# Patient Record
Sex: FEMALE | Race: WHITE | NOT HISPANIC OR LATINO
[De-identification: names, ages, dates, MRNs, and addresses within clinical notes are randomized per-mention and may not be internally consistent; named-entity substitution may affect disease eponyms.]

---

## 2018-03-26 ENCOUNTER — APPOINTMENT (OUTPATIENT)
Dept: MAMMOGRAPHY | Facility: HOSPITAL | Age: 48
End: 2018-03-26

## 2018-03-26 ENCOUNTER — APPOINTMENT (OUTPATIENT)
Dept: ULTRASOUND IMAGING | Facility: HOSPITAL | Age: 48
End: 2018-03-26

## 2018-03-26 ENCOUNTER — OUTPATIENT (OUTPATIENT)
Dept: OUTPATIENT SERVICES | Facility: HOSPITAL | Age: 48
LOS: 1 days | End: 2018-03-26
Payer: COMMERCIAL

## 2018-03-26 DIAGNOSIS — Z00.8 ENCOUNTER FOR OTHER GENERAL EXAMINATION: ICD-10-CM

## 2018-03-26 PROCEDURE — 76641 ULTRASOUND BREAST COMPLETE: CPT | Mod: 26

## 2018-03-26 PROCEDURE — 76641 ULTRASOUND BREAST COMPLETE: CPT

## 2018-03-26 PROCEDURE — 77067 SCR MAMMO BI INCL CAD: CPT | Mod: 26

## 2018-03-26 PROCEDURE — 77063 BREAST TOMOSYNTHESIS BI: CPT

## 2018-03-26 PROCEDURE — 77067 SCR MAMMO BI INCL CAD: CPT

## 2018-03-26 PROCEDURE — 77063 BREAST TOMOSYNTHESIS BI: CPT | Mod: 26

## 2018-03-30 DIAGNOSIS — Z12.31 ENCOUNTER FOR SCREENING MAMMOGRAM FOR MALIGNANT NEOPLASM OF BREAST: ICD-10-CM

## 2018-03-30 DIAGNOSIS — R92.2 INCONCLUSIVE MAMMOGRAM: ICD-10-CM

## 2019-05-06 ENCOUNTER — APPOINTMENT (OUTPATIENT)
Dept: MAMMOGRAPHY | Facility: HOSPITAL | Age: 49
End: 2019-05-06
Payer: COMMERCIAL

## 2019-05-06 ENCOUNTER — APPOINTMENT (OUTPATIENT)
Dept: ULTRASOUND IMAGING | Facility: HOSPITAL | Age: 49
End: 2019-05-06
Payer: COMMERCIAL

## 2019-05-06 ENCOUNTER — OUTPATIENT (OUTPATIENT)
Dept: OUTPATIENT SERVICES | Facility: HOSPITAL | Age: 49
LOS: 1 days | End: 2019-05-06
Payer: COMMERCIAL

## 2019-05-06 DIAGNOSIS — Z12.31 ENCOUNTER FOR SCREENING MAMMOGRAM FOR MALIGNANT NEOPLASM OF BREAST: ICD-10-CM

## 2019-05-06 PROCEDURE — 77067 SCR MAMMO BI INCL CAD: CPT | Mod: 26

## 2019-05-06 PROCEDURE — 77063 BREAST TOMOSYNTHESIS BI: CPT | Mod: 26

## 2019-05-06 PROCEDURE — 76641 ULTRASOUND BREAST COMPLETE: CPT | Mod: 26,50

## 2019-05-06 PROCEDURE — 77067 SCR MAMMO BI INCL CAD: CPT

## 2019-05-06 PROCEDURE — 76641 ULTRASOUND BREAST COMPLETE: CPT

## 2019-05-06 PROCEDURE — 77063 BREAST TOMOSYNTHESIS BI: CPT

## 2020-07-29 ENCOUNTER — OUTPATIENT (OUTPATIENT)
Dept: OUTPATIENT SERVICES | Facility: HOSPITAL | Age: 50
LOS: 1 days | End: 2020-07-29
Payer: COMMERCIAL

## 2020-07-29 ENCOUNTER — APPOINTMENT (OUTPATIENT)
Dept: RADIOLOGY | Facility: HOSPITAL | Age: 50
End: 2020-07-29
Payer: COMMERCIAL

## 2020-07-29 ENCOUNTER — APPOINTMENT (OUTPATIENT)
Dept: MAMMOGRAPHY | Facility: HOSPITAL | Age: 50
End: 2020-07-29
Payer: COMMERCIAL

## 2020-07-29 ENCOUNTER — APPOINTMENT (OUTPATIENT)
Dept: ULTRASOUND IMAGING | Facility: HOSPITAL | Age: 50
End: 2020-07-29
Payer: COMMERCIAL

## 2020-07-29 DIAGNOSIS — Z12.73 ENCOUNTER FOR SCREENING FOR MALIGNANT NEOPLASM OF OVARY: ICD-10-CM

## 2020-07-29 PROCEDURE — 77080 DXA BONE DENSITY AXIAL: CPT | Mod: 26

## 2020-07-29 PROCEDURE — 76856 US EXAM PELVIC COMPLETE: CPT | Mod: 26

## 2020-07-29 PROCEDURE — 76830 TRANSVAGINAL US NON-OB: CPT

## 2020-07-29 PROCEDURE — 76830 TRANSVAGINAL US NON-OB: CPT | Mod: 26

## 2020-07-29 PROCEDURE — 77067 SCR MAMMO BI INCL CAD: CPT

## 2020-07-29 PROCEDURE — 77063 BREAST TOMOSYNTHESIS BI: CPT | Mod: 26

## 2020-07-29 PROCEDURE — 76856 US EXAM PELVIC COMPLETE: CPT

## 2020-07-29 PROCEDURE — 77063 BREAST TOMOSYNTHESIS BI: CPT

## 2020-07-29 PROCEDURE — 76641 ULTRASOUND BREAST COMPLETE: CPT | Mod: 26,50

## 2020-07-29 PROCEDURE — 77067 SCR MAMMO BI INCL CAD: CPT | Mod: 26

## 2020-07-29 PROCEDURE — 76641 ULTRASOUND BREAST COMPLETE: CPT

## 2020-07-29 PROCEDURE — 77080 DXA BONE DENSITY AXIAL: CPT

## 2020-07-30 ENCOUNTER — APPOINTMENT (OUTPATIENT)
Dept: DERMATOLOGY | Facility: CLINIC | Age: 50
End: 2020-07-30
Payer: COMMERCIAL

## 2020-07-30 VITALS — WEIGHT: 180 LBS | BODY MASS INDEX: 31.89 KG/M2 | HEIGHT: 63 IN

## 2020-07-30 DIAGNOSIS — Z80.8 FAMILY HISTORY OF MALIGNANT NEOPLASM OF OTHER ORGANS OR SYSTEMS: ICD-10-CM

## 2020-07-30 PROCEDURE — 99203 OFFICE O/P NEW LOW 30 MIN: CPT | Mod: 25

## 2020-07-30 PROCEDURE — 17000 DESTRUCT PREMALG LESION: CPT

## 2020-12-04 ENCOUNTER — TRANSCRIPTION ENCOUNTER (OUTPATIENT)
Age: 50
End: 2020-12-04

## 2021-01-26 ENCOUNTER — NON-APPOINTMENT (OUTPATIENT)
Age: 51
End: 2021-01-26

## 2021-01-28 ENCOUNTER — APPOINTMENT (OUTPATIENT)
Dept: DERMATOLOGY | Facility: CLINIC | Age: 51
End: 2021-01-28
Payer: COMMERCIAL

## 2021-01-28 VITALS — HEIGHT: 63 IN | WEIGHT: 180 LBS | BODY MASS INDEX: 31.89 KG/M2

## 2021-01-28 DIAGNOSIS — L71.0 PERIORAL DERMATITIS: ICD-10-CM

## 2021-01-28 DIAGNOSIS — L57.0 ACTINIC KERATOSIS: ICD-10-CM

## 2021-01-28 PROCEDURE — 99213 OFFICE O/P EST LOW 20 MIN: CPT

## 2021-01-28 PROCEDURE — 99072 ADDL SUPL MATRL&STAF TM PHE: CPT

## 2021-01-28 RX ORDER — METRONIDAZOLE 7.5 MG/G
0.75 CREAM TOPICAL
Qty: 1 | Refills: 2 | Status: ACTIVE | COMMUNITY
Start: 2021-01-28 | End: 1900-01-01

## 2021-03-03 ENCOUNTER — EMERGENCY (EMERGENCY)
Facility: HOSPITAL | Age: 51
LOS: 1 days | Discharge: HOME | End: 2021-03-03
Attending: EMERGENCY MEDICINE | Admitting: EMERGENCY MEDICINE
Payer: COMMERCIAL

## 2021-03-03 VITALS
DIASTOLIC BLOOD PRESSURE: 80 MMHG | TEMPERATURE: 98 F | HEART RATE: 64 BPM | SYSTOLIC BLOOD PRESSURE: 130 MMHG | OXYGEN SATURATION: 99 % | RESPIRATION RATE: 17 BRPM

## 2021-03-03 VITALS — TEMPERATURE: 98 F

## 2021-03-03 DIAGNOSIS — Z90.49 ACQUIRED ABSENCE OF OTHER SPECIFIED PARTS OF DIGESTIVE TRACT: Chronic | ICD-10-CM

## 2021-03-03 LAB
ALBUMIN SERPL ELPH-MCNC: 3.9 G/DL — SIGNIFICANT CHANGE UP (ref 3.3–5)
ALP SERPL-CCNC: 90 U/L — SIGNIFICANT CHANGE UP (ref 40–120)
ALT FLD-CCNC: 35 U/L — SIGNIFICANT CHANGE UP (ref 10–45)
ANION GAP SERPL CALC-SCNC: 6 MMOL/L — SIGNIFICANT CHANGE UP (ref 5–17)
APPEARANCE UR: CLEAR — SIGNIFICANT CHANGE UP
AST SERPL-CCNC: 38 U/L — SIGNIFICANT CHANGE UP (ref 10–40)
BASOPHILS # BLD AUTO: 0.05 K/UL — SIGNIFICANT CHANGE UP (ref 0–0.2)
BASOPHILS NFR BLD AUTO: 0.7 % — SIGNIFICANT CHANGE UP (ref 0–2)
BILIRUB SERPL-MCNC: 0.3 MG/DL — SIGNIFICANT CHANGE UP (ref 0.2–1.2)
BILIRUB UR-MCNC: NEGATIVE — SIGNIFICANT CHANGE UP
BUN SERPL-MCNC: 14 MG/DL — SIGNIFICANT CHANGE UP (ref 7–23)
CALCIUM SERPL-MCNC: 9.1 MG/DL — SIGNIFICANT CHANGE UP (ref 8.4–10.5)
CHLORIDE SERPL-SCNC: 102 MMOL/L — SIGNIFICANT CHANGE UP (ref 96–108)
CO2 SERPL-SCNC: 29 MMOL/L — SIGNIFICANT CHANGE UP (ref 22–31)
COLOR SPEC: YELLOW — SIGNIFICANT CHANGE UP
CREAT SERPL-MCNC: 0.88 MG/DL — SIGNIFICANT CHANGE UP (ref 0.5–1.3)
DIFF PNL FLD: NEGATIVE — SIGNIFICANT CHANGE UP
EOSINOPHIL # BLD AUTO: 0.5 K/UL — SIGNIFICANT CHANGE UP (ref 0–0.5)
EOSINOPHIL NFR BLD AUTO: 7.2 % — HIGH (ref 0–6)
GLUCOSE SERPL-MCNC: 91 MG/DL — SIGNIFICANT CHANGE UP (ref 70–99)
GLUCOSE UR QL: NEGATIVE — SIGNIFICANT CHANGE UP
HCT VFR BLD CALC: 40.1 % — SIGNIFICANT CHANGE UP (ref 34.5–45)
HGB BLD-MCNC: 13.5 G/DL — SIGNIFICANT CHANGE UP (ref 11.5–15.5)
IMM GRANULOCYTES NFR BLD AUTO: 0.1 % — SIGNIFICANT CHANGE UP (ref 0–1.5)
KETONES UR-MCNC: ABNORMAL
LEUKOCYTE ESTERASE UR-ACNC: NEGATIVE — SIGNIFICANT CHANGE UP
LIDOCAIN IGE QN: 438 U/L — HIGH (ref 73–393)
LYMPHOCYTES # BLD AUTO: 1.5 K/UL — SIGNIFICANT CHANGE UP (ref 1–3.3)
LYMPHOCYTES # BLD AUTO: 21.7 % — SIGNIFICANT CHANGE UP (ref 13–44)
MCHC RBC-ENTMCNC: 30.3 PG — SIGNIFICANT CHANGE UP (ref 27–34)
MCHC RBC-ENTMCNC: 33.7 GM/DL — SIGNIFICANT CHANGE UP (ref 32–36)
MCV RBC AUTO: 89.9 FL — SIGNIFICANT CHANGE UP (ref 80–100)
MONOCYTES # BLD AUTO: 0.47 K/UL — SIGNIFICANT CHANGE UP (ref 0–0.9)
MONOCYTES NFR BLD AUTO: 6.8 % — SIGNIFICANT CHANGE UP (ref 2–14)
NEUTROPHILS # BLD AUTO: 4.38 K/UL — SIGNIFICANT CHANGE UP (ref 1.8–7.4)
NEUTROPHILS NFR BLD AUTO: 63.5 % — SIGNIFICANT CHANGE UP (ref 43–77)
NITRITE UR-MCNC: NEGATIVE — SIGNIFICANT CHANGE UP
NRBC # BLD: 0 /100 WBCS — SIGNIFICANT CHANGE UP (ref 0–0)
PH UR: 7 — SIGNIFICANT CHANGE UP (ref 5–8)
PLATELET # BLD AUTO: 292 K/UL — SIGNIFICANT CHANGE UP (ref 150–400)
POTASSIUM SERPL-MCNC: 3.8 MMOL/L — SIGNIFICANT CHANGE UP (ref 3.5–5.3)
POTASSIUM SERPL-SCNC: 3.8 MMOL/L — SIGNIFICANT CHANGE UP (ref 3.5–5.3)
PROT SERPL-MCNC: 7.5 G/DL — SIGNIFICANT CHANGE UP (ref 6–8.3)
PROT UR-MCNC: NEGATIVE — SIGNIFICANT CHANGE UP
RBC # BLD: 4.46 M/UL — SIGNIFICANT CHANGE UP (ref 3.8–5.2)
RBC # FLD: 12.8 % — SIGNIFICANT CHANGE UP (ref 10.3–14.5)
SODIUM SERPL-SCNC: 137 MMOL/L — SIGNIFICANT CHANGE UP (ref 135–145)
SP GR SPEC: 1 — LOW (ref 1.01–1.02)
UROBILINOGEN FLD QL: NEGATIVE — SIGNIFICANT CHANGE UP
WBC # BLD: 6.91 K/UL — SIGNIFICANT CHANGE UP (ref 3.8–10.5)
WBC # FLD AUTO: 6.91 K/UL — SIGNIFICANT CHANGE UP (ref 3.8–10.5)

## 2021-03-03 PROCEDURE — 96374 THER/PROPH/DIAG INJ IV PUSH: CPT

## 2021-03-03 PROCEDURE — 87086 URINE CULTURE/COLONY COUNT: CPT

## 2021-03-03 PROCEDURE — 74177 CT ABD & PELVIS W/CONTRAST: CPT | Mod: 26,MG

## 2021-03-03 PROCEDURE — G1004: CPT

## 2021-03-03 PROCEDURE — 99284 EMERGENCY DEPT VISIT MOD MDM: CPT

## 2021-03-03 PROCEDURE — 99285 EMERGENCY DEPT VISIT HI MDM: CPT

## 2021-03-03 PROCEDURE — 74177 CT ABD & PELVIS W/CONTRAST: CPT

## 2021-03-03 PROCEDURE — 85025 COMPLETE CBC W/AUTO DIFF WBC: CPT

## 2021-03-03 PROCEDURE — 36415 COLL VENOUS BLD VENIPUNCTURE: CPT

## 2021-03-03 PROCEDURE — 83690 ASSAY OF LIPASE: CPT

## 2021-03-03 PROCEDURE — 80053 COMPREHEN METABOLIC PANEL: CPT

## 2021-03-03 RX ORDER — SODIUM CHLORIDE 9 MG/ML
1000 INJECTION INTRAMUSCULAR; INTRAVENOUS; SUBCUTANEOUS ONCE
Refills: 0 | Status: COMPLETED | OUTPATIENT
Start: 2021-03-03 | End: 2021-03-03

## 2021-03-03 RX ORDER — FAMOTIDINE 10 MG/ML
20 INJECTION INTRAVENOUS ONCE
Refills: 0 | Status: COMPLETED | OUTPATIENT
Start: 2021-03-03 | End: 2021-03-03

## 2021-03-03 RX ADMIN — FAMOTIDINE 100 MILLIGRAM(S): 10 INJECTION INTRAVENOUS at 15:14

## 2021-03-03 RX ADMIN — SODIUM CHLORIDE 1000 MILLILITER(S): 9 INJECTION INTRAMUSCULAR; INTRAVENOUS; SUBCUTANEOUS at 14:15

## 2021-03-03 NOTE — ED PROVIDER NOTE - CARE PROVIDER_API CALL
Marcellus Torres (MD)  Gastroenterology; Internal Medicine  [address] [place] [address]  Phone: [phone]  Fax: [phone]  Follow Up Time:

## 2021-03-03 NOTE — ED PROVIDER NOTE - NSFOLLOWUPINSTRUCTIONS_ED_ALL_ED_FT
Drink plenty of fluids   Follow up with your pmd and GI- show copies of ER results.   Return to the ED if any worsening or persistent symptoms.     Pancreatitis    WHAT YOU NEED TO KNOW:    Pancreatitis is inflammation of your pancreas. The pancreas is an organ that makes insulin. It also makes enzymes (digestive juices) that help your body digest food. Pancreatitis may be an acute (short-term) problem that happens only once. It may become a chronic (long-term) problem that comes and goes over time.    Pancreas         DISCHARGE INSTRUCTIONS:    Call your doctor if:   •You have severe pain in your abdomen and you are vomiting.      •You have a fever.       •You continue to lose weight without trying.      •Your skin or the whites of your eyes turn yellow.      •You have questions or concerns about your condition or care.      Medicines: You may need any of the following:   •Prescription pain medicine may be given. Ask your healthcare provider how to take this medicine safely. Some prescription pain medicines contain acetaminophen. Do not take other medicines that contain acetaminophen without talking to your healthcare provider. Too much acetaminophen may cause liver damage. Prescription pain medicine may cause constipation. Ask your healthcare provider how to prevent or treat constipation.       •Antibiotics may be given to treat a bacterial infection.      •Take your medicine as directed. Contact your healthcare provider if you think your medicine is not helping or if you have side effects. Tell him or her if you are allergic to any medicine. Keep a list of the medicines, vitamins, and herbs you take. Include the amounts, and when and why you take them. Bring the list or the pill bottles to follow-up visits. Carry your medicine list with you in case of an emergency.      Self-care:   •Rest when you feel it is needed. Slowly start to do more each day. Return to your usual activities as directed.      •Do not drink any alcohol. If you need help to stop drinking, contact the following organization:   ?Alcoholics Anonymous  Web Address: http://www.aa.org          •Ask your healthcare provider or dietitian about the best foods to eat. You may need to eat foods that are low in fat if you have chronic pancreatitis.      •Do not smoke. Nicotine and other chemicals in cigarettes and cigars can cause damage. Ask your healthcare provider for information if you currently smoke and need help to quit. E-cigarettes or smokeless tobacco still contain nicotine. Talk to your healthcare provider before you use these products.       Follow up with your healthcare provider as directed: Write down your questions so you remember to ask them during your visits.        © Copyright [employer] 2021           back to top                          © Copyright [employer] 2021

## 2021-03-03 NOTE — ED PROVIDER NOTE - ATTENDING CONTRIBUTION TO CARE
Dr. Weinberg: I performed a face to face bedside interview with patient regarding history of present illness, review of symptoms and past medical history. I completed an independent physical exam.  I have discussed patient's plan of care with PA.   I agree with note as stated above, having amended the EMR as needed to reflect my findings.   This includes HISTORY OF PRESENT ILLNESS, HIV, PAST MEDICAL/SURGICAL/FAMILY/SOCIAL HISTORY, ALLERGIES AND HOME MEDICATIONS, REVIEW OF SYSTEMS, PHYSICAL EXAM, and any PROGRESS NOTES during the time I functioned as the attending physician for this patient.    see mdm

## 2021-03-03 NOTE — ED PROVIDER NOTE - PATIENT PORTAL LINK FT
You can access the FollowMyHealth Patient Portal offered by [facility] by registering at the following website: [email or web address]. By joining [employer]’s FollowMyHealth portal, you will also be able to view your health information using other applications (apps) compatible with our system.

## 2021-03-03 NOTE — ED PROVIDER NOTE - OBJECTIVE STATEMENT
50 yr old female with hx of cholecystectomy presents with acute onset of abdominal pain radiating to back, starting about 30 minutes after eating lunch. 50 yr old female with hx of cholecystectomy presents with acute onset of abdominal pain radiating to back, starting about 30 minutes after eating lunch. + nausea. No vomiting, fever, chills, diarrhea, chest pain, sob or any other symptoms.

## 2021-03-03 NOTE — ED PROVIDER NOTE - CLINICAL SUMMARY MEDICAL DECISION MAKING FREE TEXT BOX
Dr. Weinberg: 50F h/o cholecystectomy in 2002 p/w sudden onset epigastric pain radiating to the back, now improved, no fevers or chills, no nausea or vomiting, no dysuria or hematuria. No ETOH. On exam pt is well appearing, nad, rrr, ctab, abdo soft with minimal epigastric ttp, neg murphys, no cvat. Will check labs, UA, CT r/o retained stone. Pt afebrile rectally.

## 2021-03-03 NOTE — ED PROVIDER NOTE - PROGRESS NOTE DETAILS
Pt feeling better. labs, ua, and ct reviewed. Pt tolerating PO. pt stable for dc and fu with pmd and GI.

## 2021-03-04 LAB
CULTURE RESULTS: NO GROWTH — SIGNIFICANT CHANGE UP
SPECIMEN SOURCE: SIGNIFICANT CHANGE UP

## 2021-07-29 ENCOUNTER — APPOINTMENT (OUTPATIENT)
Dept: DERMATOLOGY | Facility: CLINIC | Age: 51
End: 2021-07-29
Payer: COMMERCIAL

## 2021-07-29 DIAGNOSIS — L21.9 SEBORRHEIC DERMATITIS, UNSPECIFIED: ICD-10-CM

## 2021-07-29 DIAGNOSIS — D23.9 OTHER BENIGN NEOPLASM OF SKIN, UNSPECIFIED: ICD-10-CM

## 2021-07-29 PROCEDURE — 99214 OFFICE O/P EST MOD 30 MIN: CPT

## 2021-07-29 RX ORDER — KETOCONAZOLE 20 MG/G
2 CREAM TOPICAL
Qty: 1 | Refills: 1 | Status: ACTIVE | COMMUNITY
Start: 2021-07-29 | End: 1900-01-01

## 2021-07-29 RX ORDER — MUPIROCIN 20 MG/G
2 OINTMENT TOPICAL
Qty: 1 | Refills: 0 | Status: ACTIVE | COMMUNITY
Start: 2021-07-29 | End: 1900-01-01

## 2021-09-22 ENCOUNTER — TRANSCRIPTION ENCOUNTER (OUTPATIENT)
Age: 51
End: 2021-09-22

## 2021-09-28 ENCOUNTER — TRANSCRIPTION ENCOUNTER (OUTPATIENT)
Age: 51
End: 2021-09-28

## 2021-11-02 PROBLEM — Z78.9 OTHER SPECIFIED HEALTH STATUS: Chronic | Status: ACTIVE | Noted: 2021-03-03

## 2022-01-11 ENCOUNTER — TRANSCRIPTION ENCOUNTER (OUTPATIENT)
Age: 52
End: 2022-01-11

## 2022-01-11 ENCOUNTER — APPOINTMENT (OUTPATIENT)
Dept: DERMATOLOGY | Facility: CLINIC | Age: 52
End: 2022-01-11
Payer: COMMERCIAL

## 2022-01-11 DIAGNOSIS — L28.2 OTHER PRURIGO: ICD-10-CM

## 2022-01-11 DIAGNOSIS — L82.1 OTHER SEBORRHEIC KERATOSIS: ICD-10-CM

## 2022-01-11 PROCEDURE — 99213 OFFICE O/P EST LOW 20 MIN: CPT | Mod: 25

## 2022-01-11 PROCEDURE — 17110 DESTRUCTION B9 LES UP TO 14: CPT

## 2022-01-11 NOTE — ED ADULT NURSE NOTE - CAS DISCH ACCOMP BY
Bed: 20  Expected date:   Expected time:   Means of arrival:   Comments:  [place] knee pain + covid   Self

## 2022-03-08 ENCOUNTER — APPOINTMENT (OUTPATIENT)
Dept: ULTRASOUND IMAGING | Facility: HOSPITAL | Age: 52
End: 2022-03-08
Payer: COMMERCIAL

## 2022-03-08 ENCOUNTER — OUTPATIENT (OUTPATIENT)
Dept: OUTPATIENT SERVICES | Facility: HOSPITAL | Age: 52
LOS: 1 days | End: 2022-03-08
Payer: COMMERCIAL

## 2022-03-08 DIAGNOSIS — R92.2 INCONCLUSIVE MAMMOGRAM: ICD-10-CM

## 2022-03-08 DIAGNOSIS — Z90.49 ACQUIRED ABSENCE OF OTHER SPECIFIED PARTS OF DIGESTIVE TRACT: Chronic | ICD-10-CM

## 2022-03-08 DIAGNOSIS — N94.89 OTHER SPECIFIED CONDITIONS ASSOCIATED WITH FEMALE GENITAL ORGANS AND MENSTRUAL CYCLE: ICD-10-CM

## 2022-03-08 DIAGNOSIS — Z12.31 ENCOUNTER FOR SCREENING MAMMOGRAM FOR MALIGNANT NEOPLASM OF BREAST: ICD-10-CM

## 2022-03-08 PROCEDURE — 76830 TRANSVAGINAL US NON-OB: CPT | Mod: 26

## 2022-03-08 PROCEDURE — 76830 TRANSVAGINAL US NON-OB: CPT

## 2022-03-08 PROCEDURE — 76856 US EXAM PELVIC COMPLETE: CPT | Mod: 26

## 2022-03-08 PROCEDURE — 76856 US EXAM PELVIC COMPLETE: CPT

## 2022-03-14 ENCOUNTER — APPOINTMENT (OUTPATIENT)
Dept: MAMMOGRAPHY | Facility: HOSPITAL | Age: 52
End: 2022-03-14
Payer: COMMERCIAL

## 2022-03-14 ENCOUNTER — OUTPATIENT (OUTPATIENT)
Dept: OUTPATIENT SERVICES | Facility: HOSPITAL | Age: 52
LOS: 1 days | End: 2022-03-14
Payer: COMMERCIAL

## 2022-03-14 ENCOUNTER — APPOINTMENT (OUTPATIENT)
Dept: ULTRASOUND IMAGING | Facility: HOSPITAL | Age: 52
End: 2022-03-14
Payer: COMMERCIAL

## 2022-03-14 DIAGNOSIS — Z90.49 ACQUIRED ABSENCE OF OTHER SPECIFIED PARTS OF DIGESTIVE TRACT: Chronic | ICD-10-CM

## 2022-03-14 DIAGNOSIS — N94.89 OTHER SPECIFIED CONDITIONS ASSOCIATED WITH FEMALE GENITAL ORGANS AND MENSTRUAL CYCLE: ICD-10-CM

## 2022-03-14 DIAGNOSIS — Z12.31 ENCOUNTER FOR SCREENING MAMMOGRAM FOR MALIGNANT NEOPLASM OF BREAST: ICD-10-CM

## 2022-03-14 DIAGNOSIS — R92.2 INCONCLUSIVE MAMMOGRAM: ICD-10-CM

## 2022-03-14 PROCEDURE — 77067 SCR MAMMO BI INCL CAD: CPT

## 2022-03-14 PROCEDURE — 76641 ULTRASOUND BREAST COMPLETE: CPT

## 2022-03-14 PROCEDURE — 76641 ULTRASOUND BREAST COMPLETE: CPT | Mod: 26,50

## 2022-03-14 PROCEDURE — 77063 BREAST TOMOSYNTHESIS BI: CPT | Mod: 26

## 2022-03-14 PROCEDURE — 77067 SCR MAMMO BI INCL CAD: CPT | Mod: 26

## 2022-03-14 PROCEDURE — 77063 BREAST TOMOSYNTHESIS BI: CPT

## 2022-07-14 ENCOUNTER — APPOINTMENT (OUTPATIENT)
Dept: DERMATOLOGY | Facility: CLINIC | Age: 52
End: 2022-07-14

## 2022-12-08 ENCOUNTER — NON-APPOINTMENT (OUTPATIENT)
Age: 52
End: 2022-12-08

## 2023-04-27 ENCOUNTER — APPOINTMENT (OUTPATIENT)
Dept: DERMATOLOGY | Facility: CLINIC | Age: 53
End: 2023-04-27
Payer: COMMERCIAL

## 2023-04-27 DIAGNOSIS — L72.0 EPIDERMAL CYST: ICD-10-CM

## 2023-04-27 DIAGNOSIS — D48.5 NEOPLASM OF UNCERTAIN BEHAVIOR OF SKIN: ICD-10-CM

## 2023-04-27 DIAGNOSIS — L70.0 ACNE VULGARIS: ICD-10-CM

## 2023-04-27 DIAGNOSIS — L81.4 OTHER MELANIN HYPERPIGMENTATION: ICD-10-CM

## 2023-04-27 DIAGNOSIS — D22.9 MELANOCYTIC NEVI, UNSPECIFIED: ICD-10-CM

## 2023-04-27 DIAGNOSIS — Z12.83 ENCOUNTER FOR SCREENING FOR MALIGNANT NEOPLASM OF SKIN: ICD-10-CM

## 2023-04-27 PROCEDURE — 11102 TANGNTL BX SKIN SINGLE LES: CPT

## 2023-04-27 PROCEDURE — 99214 OFFICE O/P EST MOD 30 MIN: CPT | Mod: 25

## 2023-04-27 RX ORDER — TRETINOIN 0.25 MG/G
0.03 CREAM TOPICAL
Qty: 1 | Refills: 5 | Status: ACTIVE | COMMUNITY
Start: 2023-04-27 | End: 1900-01-01

## 2023-04-30 PROBLEM — D22.9 MULTIPLE BENIGN NEVI: Status: ACTIVE | Noted: 2023-04-30

## 2023-04-30 PROBLEM — L81.4 LENTIGINES: Status: ACTIVE | Noted: 2020-07-30

## 2023-04-30 PROBLEM — Z12.83 SCREENING FOR SKIN CANCER: Status: ACTIVE | Noted: 2020-07-30

## 2023-05-12 LAB — CORE LAB BIOPSY: NORMAL

## 2023-05-16 ENCOUNTER — TRANSCRIPTION ENCOUNTER (OUTPATIENT)
Age: 53
End: 2023-05-16

## 2023-05-16 ENCOUNTER — NON-APPOINTMENT (OUTPATIENT)
Age: 53
End: 2023-05-16

## 2023-06-12 ENCOUNTER — RESULT REVIEW (OUTPATIENT)
Age: 53
End: 2023-06-12

## 2023-06-12 ENCOUNTER — APPOINTMENT (OUTPATIENT)
Dept: MAMMOGRAPHY | Facility: HOSPITAL | Age: 53
End: 2023-06-12
Payer: COMMERCIAL

## 2023-06-12 ENCOUNTER — APPOINTMENT (OUTPATIENT)
Dept: ULTRASOUND IMAGING | Facility: HOSPITAL | Age: 53
End: 2023-06-12
Payer: COMMERCIAL

## 2023-06-12 ENCOUNTER — OUTPATIENT (OUTPATIENT)
Dept: OUTPATIENT SERVICES | Facility: HOSPITAL | Age: 53
LOS: 1 days | End: 2023-06-12
Payer: COMMERCIAL

## 2023-06-12 DIAGNOSIS — R92.2 INCONCLUSIVE MAMMOGRAM: ICD-10-CM

## 2023-06-12 DIAGNOSIS — Z90.49 ACQUIRED ABSENCE OF OTHER SPECIFIED PARTS OF DIGESTIVE TRACT: Chronic | ICD-10-CM

## 2023-06-12 PROCEDURE — 76641 ULTRASOUND BREAST COMPLETE: CPT | Mod: 26,50

## 2023-06-12 PROCEDURE — 77063 BREAST TOMOSYNTHESIS BI: CPT | Mod: 26

## 2023-06-12 PROCEDURE — 77067 SCR MAMMO BI INCL CAD: CPT

## 2023-06-12 PROCEDURE — 76641 ULTRASOUND BREAST COMPLETE: CPT

## 2023-06-12 PROCEDURE — 77067 SCR MAMMO BI INCL CAD: CPT | Mod: 26

## 2023-06-12 PROCEDURE — 77063 BREAST TOMOSYNTHESIS BI: CPT

## 2023-09-26 ENCOUNTER — NON-APPOINTMENT (OUTPATIENT)
Age: 53
End: 2023-09-26

## 2024-01-25 ENCOUNTER — APPOINTMENT (OUTPATIENT)
Dept: ULTRASOUND IMAGING | Facility: HOSPITAL | Age: 54
End: 2024-01-25
Payer: COMMERCIAL

## 2024-01-25 ENCOUNTER — OUTPATIENT (OUTPATIENT)
Dept: OUTPATIENT SERVICES | Facility: HOSPITAL | Age: 54
LOS: 1 days | End: 2024-01-25
Payer: COMMERCIAL

## 2024-01-25 DIAGNOSIS — Z00.8 ENCOUNTER FOR OTHER GENERAL EXAMINATION: ICD-10-CM

## 2024-01-25 DIAGNOSIS — Z90.49 ACQUIRED ABSENCE OF OTHER SPECIFIED PARTS OF DIGESTIVE TRACT: Chronic | ICD-10-CM

## 2024-01-25 PROCEDURE — 76856 US EXAM PELVIC COMPLETE: CPT

## 2024-01-25 PROCEDURE — 76830 TRANSVAGINAL US NON-OB: CPT

## 2024-01-25 PROCEDURE — 76830 TRANSVAGINAL US NON-OB: CPT | Mod: 26

## 2024-01-25 PROCEDURE — 76856 US EXAM PELVIC COMPLETE: CPT | Mod: 26

## 2024-03-11 ENCOUNTER — RESULT REVIEW (OUTPATIENT)
Age: 54
End: 2024-03-11

## 2024-04-25 ENCOUNTER — APPOINTMENT (OUTPATIENT)
Dept: DERMATOLOGY | Facility: CLINIC | Age: 54
End: 2024-04-25

## 2024-06-22 ENCOUNTER — NON-APPOINTMENT (OUTPATIENT)
Age: 54
End: 2024-06-22

## 2025-03-14 ENCOUNTER — APPOINTMENT (OUTPATIENT)
Dept: ULTRASOUND IMAGING | Facility: HOSPITAL | Age: 55
End: 2025-03-14

## 2025-03-14 ENCOUNTER — RESULT REVIEW (OUTPATIENT)
Age: 55
End: 2025-03-14

## 2025-03-14 ENCOUNTER — OUTPATIENT (OUTPATIENT)
Dept: OUTPATIENT SERVICES | Facility: HOSPITAL | Age: 55
LOS: 1 days | End: 2025-03-14
Payer: COMMERCIAL

## 2025-03-14 ENCOUNTER — APPOINTMENT (OUTPATIENT)
Dept: MAMMOGRAPHY | Facility: HOSPITAL | Age: 55
End: 2025-03-14

## 2025-03-14 DIAGNOSIS — Z12.31 ENCOUNTER FOR SCREENING MAMMOGRAM FOR MALIGNANT NEOPLASM OF BREAST: ICD-10-CM

## 2025-03-14 DIAGNOSIS — Z00.8 ENCOUNTER FOR OTHER GENERAL EXAMINATION: ICD-10-CM

## 2025-03-14 DIAGNOSIS — Z90.49 ACQUIRED ABSENCE OF OTHER SPECIFIED PARTS OF DIGESTIVE TRACT: Chronic | ICD-10-CM

## 2025-03-14 DIAGNOSIS — R92.30 DENSE BREASTS, UNSPECIFIED: ICD-10-CM

## 2025-03-14 PROCEDURE — 76641 ULTRASOUND BREAST COMPLETE: CPT

## 2025-03-14 PROCEDURE — 77063 BREAST TOMOSYNTHESIS BI: CPT | Mod: 26

## 2025-03-14 PROCEDURE — 77063 BREAST TOMOSYNTHESIS BI: CPT

## 2025-03-14 PROCEDURE — 76830 TRANSVAGINAL US NON-OB: CPT | Mod: 26

## 2025-03-14 PROCEDURE — 76830 TRANSVAGINAL US NON-OB: CPT

## 2025-03-14 PROCEDURE — 77067 SCR MAMMO BI INCL CAD: CPT

## 2025-03-14 PROCEDURE — 76856 US EXAM PELVIC COMPLETE: CPT

## 2025-03-14 PROCEDURE — 76641 ULTRASOUND BREAST COMPLETE: CPT | Mod: 26,50

## 2025-03-14 PROCEDURE — 76856 US EXAM PELVIC COMPLETE: CPT | Mod: 26

## 2025-03-14 PROCEDURE — 77067 SCR MAMMO BI INCL CAD: CPT | Mod: 26

## 2025-03-17 ENCOUNTER — RESULT REVIEW (OUTPATIENT)
Age: 55
End: 2025-03-17

## 2025-04-03 ENCOUNTER — NON-APPOINTMENT (OUTPATIENT)
Age: 55
End: 2025-04-03

## 2025-04-08 ENCOUNTER — APPOINTMENT (OUTPATIENT)
Dept: DERMATOLOGY | Facility: CLINIC | Age: 55
End: 2025-04-08
Payer: COMMERCIAL

## 2025-04-08 DIAGNOSIS — Z12.83 ENCOUNTER FOR SCREENING FOR MALIGNANT NEOPLASM OF SKIN: ICD-10-CM

## 2025-04-08 DIAGNOSIS — D22.9 MELANOCYTIC NEVI, UNSPECIFIED: ICD-10-CM

## 2025-04-08 DIAGNOSIS — L57.0 ACTINIC KERATOSIS: ICD-10-CM

## 2025-04-08 DIAGNOSIS — D48.5 NEOPLASM OF UNCERTAIN BEHAVIOR OF SKIN: ICD-10-CM

## 2025-04-08 DIAGNOSIS — L81.4 OTHER MELANIN HYPERPIGMENTATION: ICD-10-CM

## 2025-04-08 PROCEDURE — 99213 OFFICE O/P EST LOW 20 MIN: CPT | Mod: 25

## 2025-04-08 PROCEDURE — 17000 DESTRUCT PREMALG LESION: CPT | Mod: 59

## 2025-04-08 PROCEDURE — 11104 PUNCH BX SKIN SINGLE LESION: CPT

## 2025-04-14 LAB — CORE LAB BIOPSY: NORMAL

## 2025-07-30 ENCOUNTER — APPOINTMENT (OUTPATIENT)
Dept: RADIOLOGY | Facility: HOSPITAL | Age: 55
End: 2025-07-30
Payer: COMMERCIAL

## 2025-07-30 ENCOUNTER — OUTPATIENT (OUTPATIENT)
Dept: OUTPATIENT SERVICES | Facility: HOSPITAL | Age: 55
LOS: 1 days | End: 2025-07-30
Payer: COMMERCIAL

## 2025-07-30 DIAGNOSIS — Z90.49 ACQUIRED ABSENCE OF OTHER SPECIFIED PARTS OF DIGESTIVE TRACT: Chronic | ICD-10-CM

## 2025-07-30 DIAGNOSIS — Z00.8 ENCOUNTER FOR OTHER GENERAL EXAMINATION: ICD-10-CM

## 2025-07-30 PROCEDURE — 73630 X-RAY EXAM OF FOOT: CPT | Mod: 26,RT

## 2025-07-30 PROCEDURE — 73630 X-RAY EXAM OF FOOT: CPT
